# Patient Record
Sex: FEMALE | Race: BLACK OR AFRICAN AMERICAN | NOT HISPANIC OR LATINO | URBAN - METROPOLITAN AREA
[De-identification: names, ages, dates, MRNs, and addresses within clinical notes are randomized per-mention and may not be internally consistent; named-entity substitution may affect disease eponyms.]

---

## 2017-02-01 ENCOUNTER — EMERGENCY (EMERGENCY)
Facility: HOSPITAL | Age: 72
LOS: 0 days | Discharge: HOME | End: 2017-02-01
Attending: EMERGENCY MEDICINE
Payer: MEDICAID

## 2017-02-01 VITALS
RESPIRATION RATE: 20 BRPM | SYSTOLIC BLOOD PRESSURE: 146 MMHG | HEART RATE: 95 BPM | TEMPERATURE: 100 F | OXYGEN SATURATION: 96 % | DIASTOLIC BLOOD PRESSURE: 74 MMHG

## 2017-02-01 VITALS
HEART RATE: 105 BPM | WEIGHT: 177.91 LBS | DIASTOLIC BLOOD PRESSURE: 62 MMHG | HEIGHT: 64 IN | OXYGEN SATURATION: 97 % | RESPIRATION RATE: 22 BRPM | TEMPERATURE: 101 F | SYSTOLIC BLOOD PRESSURE: 151 MMHG

## 2017-02-01 DIAGNOSIS — J10.1 INFLUENZA DUE TO OTHER IDENTIFIED INFLUENZA VIRUS WITH OTHER RESPIRATORY MANIFESTATIONS: ICD-10-CM

## 2017-02-01 DIAGNOSIS — I10 ESSENTIAL (PRIMARY) HYPERTENSION: ICD-10-CM

## 2017-02-01 DIAGNOSIS — M19.90 UNSPECIFIED OSTEOARTHRITIS, UNSPECIFIED SITE: ICD-10-CM

## 2017-02-01 DIAGNOSIS — Z96.659 PRESENCE OF UNSPECIFIED ARTIFICIAL KNEE JOINT: Chronic | ICD-10-CM

## 2017-02-01 DIAGNOSIS — E55.9 VITAMIN D DEFICIENCY, UNSPECIFIED: ICD-10-CM

## 2017-02-01 DIAGNOSIS — L65.9 NONSCARRING HAIR LOSS, UNSPECIFIED: ICD-10-CM

## 2017-02-01 DIAGNOSIS — R05 COUGH: ICD-10-CM

## 2017-02-01 LAB
ANION GAP SERPL CALC-SCNC: 12 MMOL/L — SIGNIFICANT CHANGE UP (ref 5–17)
BASOPHILS # BLD AUTO: 0.1 K/UL — SIGNIFICANT CHANGE UP (ref 0–0.2)
BASOPHILS NFR BLD AUTO: 0.9 % — SIGNIFICANT CHANGE UP (ref 0–2)
BUN SERPL-MCNC: 29 MG/DL — HIGH (ref 7–23)
CALCIUM SERPL-MCNC: 9.4 MG/DL — SIGNIFICANT CHANGE UP (ref 8.5–10.1)
CHLORIDE SERPL-SCNC: 107 MMOL/L — SIGNIFICANT CHANGE UP (ref 96–108)
CK MB BLD-MCNC: 0.4 % — SIGNIFICANT CHANGE UP (ref 0–3.5)
CK MB CFR SERPL CALC: 0.9 NG/ML — SIGNIFICANT CHANGE UP (ref 0.5–3.6)
CK SERPL-CCNC: 251 U/L — HIGH (ref 26–192)
CO2 SERPL-SCNC: 26 MMOL/L — SIGNIFICANT CHANGE UP (ref 22–31)
CREAT SERPL-MCNC: 1.86 MG/DL — HIGH (ref 0.5–1.3)
EOSINOPHIL # BLD AUTO: 0 K/UL — SIGNIFICANT CHANGE UP (ref 0–0.5)
EOSINOPHIL NFR BLD AUTO: 0.2 % — SIGNIFICANT CHANGE UP (ref 0–6)
FLUAV SPEC QL CULT: POSITIVE
FLUBV AG SPEC QL IA: NEGATIVE — SIGNIFICANT CHANGE UP
GLUCOSE SERPL-MCNC: 119 MG/DL — HIGH (ref 70–99)
HCT VFR BLD CALC: 36.5 % — SIGNIFICANT CHANGE UP (ref 34.5–45)
HGB BLD-MCNC: 12.2 G/DL — SIGNIFICANT CHANGE UP (ref 11.5–15.5)
LACTATE SERPL-SCNC: 0.9 MMOL/L — SIGNIFICANT CHANGE UP (ref 0.7–2)
LYMPHOCYTES # BLD AUTO: 0.5 K/UL — LOW (ref 1–3.3)
LYMPHOCYTES # BLD AUTO: 5.8 % — LOW (ref 13–44)
MCHC RBC-ENTMCNC: 27.3 PG — SIGNIFICANT CHANGE UP (ref 27–34)
MCHC RBC-ENTMCNC: 33.5 GM/DL — SIGNIFICANT CHANGE UP (ref 32–36)
MCV RBC AUTO: 81.4 FL — SIGNIFICANT CHANGE UP (ref 80–100)
MONOCYTES # BLD AUTO: 1 K/UL — HIGH (ref 0–0.9)
MONOCYTES NFR BLD AUTO: 10.8 % — SIGNIFICANT CHANGE UP (ref 2–14)
NEUTROPHILS # BLD AUTO: 7.4 K/UL — SIGNIFICANT CHANGE UP (ref 1.8–7.4)
NEUTROPHILS NFR BLD AUTO: 82.3 % — HIGH (ref 43–77)
NT-PROBNP SERPL-SCNC: 688 PG/ML — HIGH (ref 0–125)
PLATELET # BLD AUTO: 256 K/UL — SIGNIFICANT CHANGE UP (ref 150–400)
POTASSIUM SERPL-MCNC: 3.6 MMOL/L — SIGNIFICANT CHANGE UP (ref 3.5–5.3)
POTASSIUM SERPL-SCNC: 3.6 MMOL/L — SIGNIFICANT CHANGE UP (ref 3.5–5.3)
RBC # BLD: 4.48 M/UL — SIGNIFICANT CHANGE UP (ref 3.8–5.2)
RBC # FLD: 13.7 % — SIGNIFICANT CHANGE UP (ref 11–15)
SODIUM SERPL-SCNC: 145 MMOL/L — SIGNIFICANT CHANGE UP (ref 135–145)
TROPONIN I SERPL-MCNC: 0.02 NG/ML — SIGNIFICANT CHANGE UP (ref 0.01–0.04)
WBC # BLD: 9 K/UL — SIGNIFICANT CHANGE UP (ref 3.8–10.5)
WBC # FLD AUTO: 9 K/UL — SIGNIFICANT CHANGE UP (ref 3.8–10.5)

## 2017-02-01 PROCEDURE — 71020: CPT | Mod: 26

## 2017-02-01 PROCEDURE — 99285 EMERGENCY DEPT VISIT HI MDM: CPT

## 2017-02-01 RX ORDER — CHOLECALCIFEROL (VITAMIN D3) 125 MCG
1 CAPSULE ORAL
Qty: 0 | Refills: 0 | COMMUNITY

## 2017-02-01 RX ORDER — FEBUXOSTAT 40 MG/1
1 TABLET ORAL
Qty: 0 | Refills: 0 | COMMUNITY

## 2017-02-01 RX ORDER — SODIUM CHLORIDE 9 MG/ML
500 INJECTION INTRAMUSCULAR; INTRAVENOUS; SUBCUTANEOUS
Qty: 0 | Refills: 0 | Status: DISCONTINUED | OUTPATIENT
Start: 2017-02-01 | End: 2017-02-01

## 2017-02-01 RX ORDER — SODIUM CHLORIDE 9 MG/ML
1000 INJECTION INTRAMUSCULAR; INTRAVENOUS; SUBCUTANEOUS
Qty: 0 | Refills: 0 | Status: COMPLETED | OUTPATIENT
Start: 2017-02-01 | End: 2017-02-01

## 2017-02-01 RX ORDER — SODIUM CHLORIDE 9 MG/ML
3 INJECTION INTRAMUSCULAR; INTRAVENOUS; SUBCUTANEOUS EVERY 8 HOURS
Qty: 0 | Refills: 0 | Status: DISCONTINUED | OUTPATIENT
Start: 2017-02-01 | End: 2017-02-01

## 2017-02-01 RX ORDER — ACETAMINOPHEN 500 MG
975 TABLET ORAL ONCE
Qty: 0 | Refills: 0 | Status: COMPLETED | OUTPATIENT
Start: 2017-02-01 | End: 2017-02-01

## 2017-02-01 RX ADMIN — Medication 75 MILLIGRAM(S): at 18:54

## 2017-02-01 RX ADMIN — SODIUM CHLORIDE 1000 MILLILITER(S): 9 INJECTION INTRAMUSCULAR; INTRAVENOUS; SUBCUTANEOUS at 18:54

## 2017-02-01 RX ADMIN — Medication 975 MILLIGRAM(S): at 17:17

## 2017-02-01 NOTE — ED PROVIDER NOTE - PHYSICAL EXAMINATION
Gen: Alert, NAD, well appearing                  Head: NC, AT, PERRL, EOMI, normal lids/conjunctiva               ENT: normal hearing, patent oropharynx without erythema/exudate, uvula midline, moist mucosa                         Neck: supple, no tenderness/meningismus/JVD, trachea midline                   Pulm: Bilateral BS, normal resp effort, no wheeze/stridor/retractions/rales/rhonchi                      CV: tachycardic, regular, no M/R/G, good dist pulses                Abd: soft, NT/ND, no hepatosplenomegaly                   Mskel: no edema/erythema/cyanosis                Skin: no rash                 Neuro: AAOx3, no sensory/motor deficits                       Back: No tenderness

## 2017-02-01 NOTE — ED PROVIDER NOTE - MEDICAL DECISION MAKING DETAILS
Temp 101.5, given tylenol, NS.  Influenza A positive, otherwise workup unremarkable.  BUN/Cr 29/1.86, baseline.  Pt given tamiflu, nontoxic appearing, vitals stable.  f/u PMD.

## 2017-02-01 NOTE — ED ADULT TRIAGE NOTE - CHIEF COMPLAINT QUOTE
shortness of breath, wheezing and coughing since last night. pt has history of htn. pt denies any chest pain

## 2017-02-01 NOTE — ED ADULT NURSE NOTE - OBJECTIVE STATEMENT
Patient in the ER today complaining of fever and weakness, not being able to get up out of the bed. Patient denies chills, nausea, vomiting, diarrhea. Did not take anything to alleviate the symptoms. Did not get the flu shot this season.

## 2017-02-01 NOTE — ED PROVIDER NOTE - OBJECTIVE STATEMENT
Pertinent PMH/PSH/FHx/SHx and Review of Systems contained within:    70 y/o F with h/o HTN, gout, arthritis c/o dry cough, runny nose, SOB, wheezing since last night.  No sick contacts, no recent overseas travel, no other complaints.  No h/o chronic lung disease.  PMD in [place].  Febrile in triage.    No headache, No photophobia/eye pain/changes in vision, No ear pain/sore throat/dysphagia, No chest pain/palpitations, No stridor, No abdominal pain, No N/V/D, No dysuria/frequency/discharge, No neck/back pain, No rash, No lower extremity edema, No changes in neurological status/function.    No other pertinent PMH.  No other pertinent PSH.  No other pertinent FHx.  Patient denies EtOH/tobacco/illicit substance use.

## 2017-02-02 LAB
FLUAV H1 2009 PAND RNA SPEC QL NAA+PROBE: DETECTED
RAPID RVP RESULT: DETECTED

## 2017-02-07 LAB
CULTURE RESULTS: SIGNIFICANT CHANGE UP
CULTURE RESULTS: SIGNIFICANT CHANGE UP
SPECIMEN SOURCE: SIGNIFICANT CHANGE UP
SPECIMEN SOURCE: SIGNIFICANT CHANGE UP

## 2021-08-16 ENCOUNTER — EMERGENCY (EMERGENCY)
Facility: HOSPITAL | Age: 76
LOS: 1 days | Discharge: HOME | End: 2021-08-16
Attending: EMERGENCY MEDICINE | Admitting: EMERGENCY MEDICINE
Payer: MEDICARE

## 2021-08-16 VITALS
RESPIRATION RATE: 16 BRPM | TEMPERATURE: 97 F | SYSTOLIC BLOOD PRESSURE: 151 MMHG | HEART RATE: 64 BPM | DIASTOLIC BLOOD PRESSURE: 64 MMHG

## 2021-08-16 DIAGNOSIS — Z96.659 PRESENCE OF UNSPECIFIED ARTIFICIAL KNEE JOINT: Chronic | ICD-10-CM

## 2021-08-16 LAB
ALBUMIN SERPL ELPH-MCNC: 4.7 G/DL — SIGNIFICANT CHANGE UP (ref 3.3–5)
ALP SERPL-CCNC: 106 U/L — SIGNIFICANT CHANGE UP (ref 40–120)
ALT FLD-CCNC: 8 U/L — SIGNIFICANT CHANGE UP (ref 4–33)
ANION GAP SERPL CALC-SCNC: 14 MMOL/L — SIGNIFICANT CHANGE UP (ref 7–14)
APTT BLD: 32.3 SEC — SIGNIFICANT CHANGE UP (ref 27–36.3)
AST SERPL-CCNC: 15 U/L — SIGNIFICANT CHANGE UP (ref 4–32)
B PERT DNA SPEC QL NAA+PROBE: SIGNIFICANT CHANGE UP
B PERT+PARAPERT DNA PNL SPEC NAA+PROBE: SIGNIFICANT CHANGE UP
BASE EXCESS BLDV CALC-SCNC: 1.8 MMOL/L — SIGNIFICANT CHANGE UP (ref -2–3)
BASOPHILS # BLD AUTO: 0.03 K/UL — SIGNIFICANT CHANGE UP (ref 0–0.2)
BASOPHILS NFR BLD AUTO: 0.3 % — SIGNIFICANT CHANGE UP (ref 0–2)
BILIRUB SERPL-MCNC: 0.3 MG/DL — SIGNIFICANT CHANGE UP (ref 0.2–1.2)
BLD GP AB SCN SERPL QL: NEGATIVE — SIGNIFICANT CHANGE UP
BLOOD GAS VENOUS COMPREHENSIVE RESULT: SIGNIFICANT CHANGE UP
BORDETELLA PARAPERTUSSIS (RAPRVP): SIGNIFICANT CHANGE UP
BUN SERPL-MCNC: 38 MG/DL — HIGH (ref 7–23)
C PNEUM DNA SPEC QL NAA+PROBE: SIGNIFICANT CHANGE UP
CALCIUM SERPL-MCNC: 10.6 MG/DL — HIGH (ref 8.4–10.5)
CHLORIDE BLDV-SCNC: 111 MMOL/L — HIGH (ref 96–108)
CHLORIDE SERPL-SCNC: 106 MMOL/L — SIGNIFICANT CHANGE UP (ref 98–107)
CO2 BLDV-SCNC: 29.2 MMOL/L — HIGH (ref 22–26)
CO2 SERPL-SCNC: 22 MMOL/L — SIGNIFICANT CHANGE UP (ref 22–31)
CREAT SERPL-MCNC: 2.12 MG/DL — HIGH (ref 0.5–1.3)
EOSINOPHIL # BLD AUTO: 0.31 K/UL — SIGNIFICANT CHANGE UP (ref 0–0.5)
EOSINOPHIL NFR BLD AUTO: 3.1 % — SIGNIFICANT CHANGE UP (ref 0–6)
FLUAV SUBTYP SPEC NAA+PROBE: SIGNIFICANT CHANGE UP
FLUBV RNA SPEC QL NAA+PROBE: SIGNIFICANT CHANGE UP
GAS PNL BLDV: 142 MMOL/L — SIGNIFICANT CHANGE UP (ref 136–145)
GLUCOSE BLDV-MCNC: 102 MG/DL — HIGH (ref 70–99)
GLUCOSE SERPL-MCNC: 103 MG/DL — HIGH (ref 70–99)
HADV DNA SPEC QL NAA+PROBE: SIGNIFICANT CHANGE UP
HCO3 BLDV-SCNC: 28 MMOL/L — SIGNIFICANT CHANGE UP (ref 22–29)
HCOV 229E RNA SPEC QL NAA+PROBE: SIGNIFICANT CHANGE UP
HCOV HKU1 RNA SPEC QL NAA+PROBE: SIGNIFICANT CHANGE UP
HCOV NL63 RNA SPEC QL NAA+PROBE: SIGNIFICANT CHANGE UP
HCOV OC43 RNA SPEC QL NAA+PROBE: SIGNIFICANT CHANGE UP
HCT VFR BLD CALC: 37.3 % — SIGNIFICANT CHANGE UP (ref 34.5–45)
HCT VFR BLDA CALC: 35 % — SIGNIFICANT CHANGE UP (ref 34.5–46.5)
HGB BLD CALC-MCNC: 11.7 G/DL — SIGNIFICANT CHANGE UP (ref 11.5–15.5)
HGB BLD-MCNC: 11.4 G/DL — LOW (ref 11.5–15.5)
HMPV RNA SPEC QL NAA+PROBE: SIGNIFICANT CHANGE UP
HPIV1 RNA SPEC QL NAA+PROBE: SIGNIFICANT CHANGE UP
HPIV2 RNA SPEC QL NAA+PROBE: SIGNIFICANT CHANGE UP
HPIV3 RNA SPEC QL NAA+PROBE: SIGNIFICANT CHANGE UP
HPIV4 RNA SPEC QL NAA+PROBE: SIGNIFICANT CHANGE UP
IANC: 6.1 K/UL — SIGNIFICANT CHANGE UP (ref 1.5–8.5)
IMM GRANULOCYTES NFR BLD AUTO: 0.3 % — SIGNIFICANT CHANGE UP (ref 0–1.5)
INR BLD: 0.97 RATIO — SIGNIFICANT CHANGE UP (ref 0.88–1.16)
LACTATE BLDV-MCNC: 1 MMOL/L — SIGNIFICANT CHANGE UP (ref 0.5–2)
LYMPHOCYTES # BLD AUTO: 2.6 K/UL — SIGNIFICANT CHANGE UP (ref 1–3.3)
LYMPHOCYTES # BLD AUTO: 26.2 % — SIGNIFICANT CHANGE UP (ref 13–44)
M PNEUMO DNA SPEC QL NAA+PROBE: SIGNIFICANT CHANGE UP
MCHC RBC-ENTMCNC: 25.2 PG — LOW (ref 27–34)
MCHC RBC-ENTMCNC: 30.6 GM/DL — LOW (ref 32–36)
MCV RBC AUTO: 82.3 FL — SIGNIFICANT CHANGE UP (ref 80–100)
MONOCYTES # BLD AUTO: 0.85 K/UL — SIGNIFICANT CHANGE UP (ref 0–0.9)
MONOCYTES NFR BLD AUTO: 8.6 % — SIGNIFICANT CHANGE UP (ref 2–14)
NEUTROPHILS # BLD AUTO: 6.1 K/UL — SIGNIFICANT CHANGE UP (ref 1.8–7.4)
NEUTROPHILS NFR BLD AUTO: 61.5 % — SIGNIFICANT CHANGE UP (ref 43–77)
NRBC # BLD: 0 /100 WBCS — SIGNIFICANT CHANGE UP
NRBC # FLD: 0 K/UL — SIGNIFICANT CHANGE UP
PCO2 BLDV: 48 MMHG — HIGH (ref 39–42)
PH BLDV: 7.37 — SIGNIFICANT CHANGE UP (ref 7.32–7.43)
PLATELET # BLD AUTO: 295 K/UL — SIGNIFICANT CHANGE UP (ref 150–400)
PO2 BLDV: 34 MMHG — SIGNIFICANT CHANGE UP
POTASSIUM BLDV-SCNC: 4.2 MMOL/L — SIGNIFICANT CHANGE UP (ref 3.5–5.1)
POTASSIUM SERPL-MCNC: 4.2 MMOL/L — SIGNIFICANT CHANGE UP (ref 3.5–5.3)
POTASSIUM SERPL-SCNC: 4.2 MMOL/L — SIGNIFICANT CHANGE UP (ref 3.5–5.3)
PROT SERPL-MCNC: 8 G/DL — SIGNIFICANT CHANGE UP (ref 6–8.3)
PROTHROM AB SERPL-ACNC: 11.2 SEC — SIGNIFICANT CHANGE UP (ref 10.6–13.6)
RAPID RVP RESULT: DETECTED
RBC # BLD: 4.53 M/UL — SIGNIFICANT CHANGE UP (ref 3.8–5.2)
RBC # FLD: 15 % — HIGH (ref 10.3–14.5)
RH IG SCN BLD-IMP: POSITIVE — SIGNIFICANT CHANGE UP
RSV RNA SPEC QL NAA+PROBE: SIGNIFICANT CHANGE UP
RV+EV RNA SPEC QL NAA+PROBE: DETECTED
SAO2 % BLDV: 60.6 % — SIGNIFICANT CHANGE UP
SARS-COV-2 RNA SPEC QL NAA+PROBE: SIGNIFICANT CHANGE UP
SODIUM SERPL-SCNC: 142 MMOL/L — SIGNIFICANT CHANGE UP (ref 135–145)
WBC # BLD: 9.92 K/UL — SIGNIFICANT CHANGE UP (ref 3.8–10.5)
WBC # FLD AUTO: 9.92 K/UL — SIGNIFICANT CHANGE UP (ref 3.8–10.5)

## 2021-08-16 PROCEDURE — 70551 MRI BRAIN STEM W/O DYE: CPT | Mod: 26

## 2021-08-16 PROCEDURE — 99220: CPT

## 2021-08-16 PROCEDURE — 72141 MRI NECK SPINE W/O DYE: CPT | Mod: 26

## 2021-08-16 PROCEDURE — 73030 X-RAY EXAM OF SHOULDER: CPT | Mod: 26,LT

## 2021-08-16 PROCEDURE — 70450 CT HEAD/BRAIN W/O DYE: CPT | Mod: 26

## 2021-08-16 PROCEDURE — 70549 MR ANGIOGRAPH NECK W/O&W/DYE: CPT | Mod: 26

## 2021-08-16 RX ORDER — AMLODIPINE BESYLATE 2.5 MG/1
10 TABLET ORAL DAILY
Refills: 0 | Status: DISCONTINUED | OUTPATIENT
Start: 2021-08-16 | End: 2021-08-19

## 2021-08-16 RX ORDER — ASPIRIN/CALCIUM CARB/MAGNESIUM 324 MG
81 TABLET ORAL DAILY
Refills: 0 | Status: DISCONTINUED | OUTPATIENT
Start: 2021-08-16 | End: 2021-08-19

## 2021-08-16 NOTE — ED PROVIDER NOTE - ATTENDING CONTRIBUTION TO CARE
75F p/w L arm weakness starting at about 7-8 AM this morning, dropped a cup of coffee.  USOH yesterday.  L arm weakness improved - no focal weakness here.  No fever, cough, SOB, or other sx.  Stroke code called.  Given h/o BELLE pt and family declined IV contrast for CT.  Noncon CTH done.  Plan for MRI/MRA, echo, consider vasc duplex of carotids (can be done OP), place in CDU.  More likely cervical radiculopathy, r/o TIA.    VS:  unremarkable    GEN - NAD;   well appearing;   A+O x3   HEAD - NC/AT     ENT - PEERL, EOMI, mucous membranes    moist , no discharge      NECK: Neck supple, non-tender without lymphadenopathy, no masses, no JVD  PULM - CTA b/l,  symmetric breath sounds  COR -  normal heart sounds    ABD - , ND, NT, soft,  BACK - no CVA tenderness, nontender spine     EXTREMS - no edema, no deformity, warm and well perfused    SKIN - no rash    or bruising      NEUROLOGIC - alert, face symmetric, speech fluent, sensation nl, motor no focal deficit.

## 2021-08-16 NOTE — ED ADULT NURSE NOTE - NSIMPLEMENTINTERV_GEN_ALL_ED
Implemented All Universal Safety Interventions:  [place] to call system. Call bell, personal items and telephone within reach. Instruct patient to call for assistance. Room bathroom lighting operational. Non-slip footwear when patient is off stretcher. Physically safe environment: no spills, clutter or unnecessary equipment. Stretcher in lowest position, wheels locked, appropriate side rails in place.

## 2021-08-16 NOTE — ED PROVIDER NOTE - PHYSICAL EXAMINATION
Gen: WDWN, NAD  HEENT: EOMI, no nasal discharge, mucous membranes moist  CV: 2+ radial pulses b/l  Resp: no accessory muscle use, no increased WOB  GI: Abdomen soft non-distended, NTTP, no masses  MSK: No open wounds, no bruising, no LE edema  Neuro: A&Ox4, following commands, moving all four extremities spontaneously. CN3-12 intact, AOX3, EOMI. PERRLA, strength 5/5 in RUE, RLE, LLE, 4+/5 LUE,  strength 4+/5, decreased sensation LUE, sensation intact LLE, RUE, RLE. Neg for pronator drift, normal finger to nose.  Psych: appropriate mood

## 2021-08-16 NOTE — ED PROVIDER NOTE - NS ED ROS FT
Gen: Denies fevers  Resp: Denies SOB, cough  GI: Denies nausea, vomiting  Msk: + L arm pain  : Denies dysuria  Neuro: + L arm weakness, decreased sensation

## 2021-08-16 NOTE — CONSULT NOTE ADULT - SUBJECTIVE AND OBJECTIVE BOX
HPI: Patient is a 76 yo F w/ pmhx of HTN & gout, on ASA 81 mg at baseline, presents to [facility] ED as code stroke for Left arm weakness at 8 am. LKW at 8 am when the patient dropped a cup, as observed by the daughter. Since then patient endorses weakness and pain on the left arm when she lifts it. Denies dizziness, changes in vision, changes in ambulation, N/V, HA.    (Stroke only)  NIHSS:   MRS:   ICH:     REVIEW OF SYSTEMS  General:	  Skin/Breast:	  Ophthalmologic:  ENMT:	  Respiratory and Thorax:	  Cardiovascular:	  Gastrointestinal:	  Genitourinary:	  Musculoskeletal:	  Neurological:	  Psychiatric:	  Hematology/Lymphatics:	  Endocrine:	  Allergic/Immunologic:	    A 10-system ROS was performed and is negative except for those items noted above and/or in the HPI.    PAST MEDICAL & SURGICAL HISTORY:    FAMILY HISTORY:    SOCIAL HISTORY:   T/E/D:   Occupation:   Lives with:     MEDICATIONS (HOME):  Home Medications:    MEDICATIONS  (STANDING):    MEDICATIONS  (PRN):    ALLERGIES/INTOLERANCES:  Allergies  No Known Allergies    Intolerances    VITALS & EXAMINATION:  Vital Signs Last 24 Hrs  T(C): 36.2 (16 Aug 2021 11:33), Max: 36.2 (16 Aug 2021 11:33)  T(F): 97.2 (16 Aug 2021 11:33), Max: 97.2 (16 Aug 2021 11:33)  HR: 64 (16 Aug 2021 11:33) (64 - 64)  BP: 151/64 (16 Aug 2021 11:33) (151/64 - 151/64)  BP(mean): --  RR: 16 (16 Aug 2021 11:33) (16 - 16)  SpO2: --    General:  Constitutional: Obese Female, appears stated age, in no apparent distress including pain  Head: Normocephalic & atraumatic.  ENT: Patent ear canals, intact TM, mucus membranes moist & pink, neck supple, no lymphadenopathy.   Respiratory: Patent airway. All lung fields are clear to auscultation bilaterally.  Extremities: No cyanosis, clubbing, or edema.  Skin: No rashes, bruising, or discoloration.    Cardiovascular (>2): RRR no murmurs. Carotid pulsations symmetric, no bruits. Normal capillary beds refill, 1-2 seconds or less.     Neurological (>12):  MS: Awake, alert, oriented to person, place, situation, time. Normal affect. Follows all commands.    Language: Speech is clear, fluent with good repetition & comprehension (able to name objects___)    CNs: PERRLA (R = 3mm, L = 3mm). VFF. EOMI no nystagmus, no diplopia. V1-3 intact to LT/pinprick, well developed masseter muscles b/l. No facial asymmetry b/l, full eye closure strength b/l. Hearing grossly normal (rubbing fingers) b/l. Symmetric palate elevation in midline. Gag reflex deferred. Head turning & shoulder shrug intact b/l. Tongue midline, normal movements, no atrophy.    Fundoscopic: pale w/ sharp discs margins No vascular changes.      Motor: Normal muscle bulk & tone. No noticeable tremor or seizure. No pronator drift.              Deltoid	Biceps	Triceps	Wrist	Finger ABd	   R	5	5	5	5	5		5 	  L	5	5	5	5	5		5    	H-Flex	H-Ext	H-ABd	H-ADd	K-Flex	K-Ext	D-Flex	P-Flex  R	5	5	5	5	5	5	5	5 	   L	5	5	5	5	5	5	5	5	     Sensation: Intact to LT/PP/Temp/Vibration/Position b/l throughout.     Cortical: Extinction on DSS (neglect): none    Reflexes:              Biceps(C5)       BR(C6)     Triceps(C7)               Patellar(L4)    Achilles(S1)    Plantar Resp  R	2	          2	             2		        2		    2		Down   L	2	          2	             2		        2		    2		Down     Coordination: intact rapid-alt movements. No dysmetria to FTN/HTS    Gait: Normal Romberg. No postural instability. Normal stance and tandem gait.     LABORATORY:  CBC   Chem       LFTs   Coagulopathy   Lipid Panel   A1c   Cardiac enzymes     U/A   CSF  Immunological  Other    STUDIES & IMAGING:  Studies (EKG, EEG, EMG, etc):     Radiology (XR, CT, MR, U/S, TTE/VEGA): HPI: Patient is a 74 yo F w/ pmhx of HTN & gout, on ASA 81 mg at baseline, presents to [facility] ED as code stroke for Left arm weakness at 8 am. LKW at 8 am when the patient dropped a cup, as observed by the daughter. Since then patient endorses weakness and pain on the left arm when she lifts it. Denies dizziness, changes in vision, changes in ambulation, N/V, HA. Denies trauma to the arm or neck. Patient ambulates with a cane at baseline and requires supervision when walking. Needs assistance with some ADLS.     A 10-system ROS was performed and is negative except for those items noted above and/or in the HPI.    PAST MEDICAL & SURGICAL HISTORY:    FAMILY HISTORY:    SOCIAL HISTORY:   T/E/D:   Occupation:   Lives with:     MEDICATIONS (HOME):  Home Medications:    MEDICATIONS  (STANDING):    MEDICATIONS  (PRN):    ALLERGIES/INTOLERANCES:  Allergies  No Known Allergies    Intolerances    VITALS & EXAMINATION:  Vital Signs Last 24 Hrs  T(C): 36.2 (16 Aug 2021 11:33), Max: 36.2 (16 Aug 2021 11:33)  T(F): 97.2 (16 Aug 2021 11:33), Max: 97.2 (16 Aug 2021 11:33)  HR: 64 (16 Aug 2021 11:33) (64 - 64)  BP: 151/64 (16 Aug 2021 11:33) (151/64 - 151/64)  BP(mean): --  RR: 16 (16 Aug 2021 11:33) (16 - 16)  SpO2: --    General:  Constitutional: Female, appears stated age, in no apparent distress including pain    Neurological (>12):  MS: Awake, alert, oriented to person, place, situation, time. Normal affect. Follows all commands.    Language: Speech is clear, fluent with good repetition & comprehension.    CNs: PERRL (R = 3mm, L = 3mm). VFF. EOMI no nystagmus, no diplopia. V1-3 intact to LT/pinprick b/l. No facial asymmetry b/l, full eye closure strength b/l. Hearing grossly normal (rubbing fingers) b/l. Symmetric palate elevation in midline. Gag reflex deferred. Head turning & shoulder shrug intact b/l. Tongue midline, normal movements, no atrophy.    Motor: Normal muscle bulk & tone. No noticeable tremor or seizure. No motor drift on UE or LE b/l.              Deltoid	Biceps	Triceps	Wrist	   R	5	5	5	5	5 	  L	5	4	4	5	4    	H-Flex	K-Flex	K-Ext	D-Flex	P-Flex  R	5	5	5	5	5		   L	5	5	5	5	5		     Sensation: Hypoesthesia on Left arm. Otherwise, intact to LT b/l throughout.     Cortical: Extinction on DSS (neglect): none    Reflexes:              Biceps(C5)       BR(C6)     Triceps(C7)               Patellar(L4)    Achilles(S1)    Plantar Resp  R	  L	    Coordination: intact rapid-alt movements. No dysmetria to FTN/HTS    Gait:       STUDIES & IMAGING:  Studies (EKG, EEG, EMG, etc):     Radiology (XR, CT, MR, U/S, TTE/VEGA):    NCCT: No acute hemorrhage, midline shift, mass effect, or acute infarcts. 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 LKW at 8 am when the patient dropped a cup, as observed by the daughter. Since then patient endorses weakness and pain on the left arm when she lifts it. Patient claims that she dropped the cup 2/2 dull pain she was experiencing from her left shoulder to her wrist. Denies dizziness, changes in vision, changes in ambulation, N/V, HA. Denies trauma to the arm or neck. Patient ambulates with a cane at baseline and requires supervision when walking. Needs assistance with some ADLS.     A 10-system ROS was performed and is negative except for those items noted above and/or in the HPI.    PAST MEDICAL & SURGICAL HISTORY:    FAMILY HISTORY:    SOCIAL HISTORY:   T/E/D:   Occupation:   Lives with:     MEDICATIONS (HOME):  Home Medications:    MEDICATIONS  (STANDING):    MEDICATIONS  (PRN):    ALLERGIES/INTOLERANCES:  Allergies  No Known Allergies    Intolerances    VITALS & EXAMINATION:  Vital Signs Last 24 Hrs  T(C): 36.2 (16 Aug 2021 11:33), Max: 36.2 (16 Aug 2021 11:33)  T(F): 97.2 (16 Aug 2021 11:33), Max: 97.2 (16 Aug 2021 11:33)  HR: 64 (16 Aug 2021 11:33) (64 - 64)  BP: 151/64 (16 Aug 2021 11:33) (151/64 - 151/64)  BP(mean): --  RR: 16 (16 Aug 2021 11:33) (16 - 16)  SpO2: --    General:  Constitutional: Female, appears stated age, in no apparent distress including pain    Neurological (>12):  MS: Awake, alert, oriented to person, place, situation, time. Normal affect. Follows all commands.    Language: Speech is clear, fluent with good repetition & comprehension.    CNs: PERRL (R = 3mm, L = 3mm). VFF. EOMI no nystagmus, no diplopia. V1-3 intact to LT b/l. No facial asymmetry b/l, full eye closure strength b/l. Hearing grossly normal (rubbing fingers) b/l. Symmetric palate elevation in midline. Gag reflex deferred. Head turning & shoulder shrug intact b/l. Tongue midline, normal movements, no atrophy.    Motor: Normal muscle bulk & tone. No noticeable tremor or seizure. No motor drift on UE or LE b/l.              Deltoid	Biceps	Triceps	Wrist	   R	5	5	5	5	5 	  L	4	4+	4+	5	4+    Pain elicited w/ L. deltoid flexion.     	H-Flex	K-Flex	K-Ext	D-Flex	P-Flex  R	5	5	5	5	5		   L	5	5	5	5	5		     Sensation: Hypoesthesia on Left arm. Otherwise, intact to LT b/l throughout.     Cortical: Extinction on DSS (neglect): none    Reflexes:              Biceps(C5)       BR(C6)     Triceps(C7)               Patellar(L4)    Achilles(S1)    Plantar Resp  R	2                        2             1                                Mute          1                   Down  L	2                        2             1                                Mute          1                   Down    Coordination: intact rapid-alt movements. No dysmetria to FTN/HTS    Gait: Steady stance & gait, ambulates with cane in R. hand at baseline.       STUDIES & IMAGING:  Studies (EKG, EEG, EMG, etc):     Radiology (XR, CT, MR, U/S, TTE/VEGA):    NCCT: No acute hemorrhage, midline shift, mass effect, or acute infarcts. There is diffuse cerebral volume loss with prominence of the sulci, fissures, and cisternal spaces which is normal for the patient's age. There is minimal deep and periventricular white matter hypoattenuation statistically compatible with microvascular changes given calcific atherosclerotic disease of the intracranial arteries.    Patient refused IV contrast to obtain CTA 2/2 elevated Cr.        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 LKW at 8 am when the patient dropped a cup, as observed by the daughter. Since then patient endorses weakness and pain on the left arm when she lifts it. Patient claims that she dropped the cup 2/2 dull pain she was experiencing from her left shoulder to her wrist. Denies dizziness, changes in vision, changes in ambulation, N/V, HA. Denies trauma to the arm or neck. Patient ambulates with a cane at baseline and requires supervision when walking. Needs assistance with some ADLS.     A 10-system ROS was performed and is negative except for those items noted above and/or in the HPI.    PAST MEDICAL & SURGICAL HISTORY:    FAMILY HISTORY:    SOCIAL HISTORY:   T/E/D:   Occupation:   Lives with:     MEDICATIONS (HOME):  Home Medications:    MEDICATIONS  (STANDING):    MEDICATIONS  (PRN):    ALLERGIES/INTOLERANCES:  Allergies  No Known Allergies    Intolerances    VITALS & EXAMINATION:  Vital Signs Last 24 Hrs  T(C): 36.2 (16 Aug 2021 11:33), Max: 36.2 (16 Aug 2021 11:33)  T(F): 97.2 (16 Aug 2021 11:33), Max: 97.2 (16 Aug 2021 11:33)  HR: 64 (16 Aug 2021 11:33) (64 - 64)  BP: 151/64 (16 Aug 2021 11:33) (151/64 - 151/64)  BP(mean): --  RR: 16 (16 Aug 2021 11:33) (16 - 16)  SpO2: --    General:  Constitutional: Female, appears stated age, in no apparent distress including pain    Neurological (>12):  MS: Awake, alert, oriented to person, place, situation, time. Normal affect. Follows all commands.    Language: Speech is clear, fluent with good repetition & comprehension.    CNs: PERRL (R = 3mm, L = 3mm). VFF. EOMI no nystagmus, no diplopia. V1-3 intact to LT b/l. No facial asymmetry b/l, full eye closure strength b/l. Hearing grossly normal (rubbing fingers) b/l. Symmetric palate elevation in midline. Gag reflex deferred. Head turning & shoulder shrug intact b/l. Tongue midline, normal movements, no atrophy.    Motor: Normal muscle bulk & tone. No noticeable tremor or seizure. No motor drift on UE or LE b/l.              Deltoid	Biceps	Triceps	Wrist	   R	5	5	5	5	5 	  L	4	4+	4+	5	4+    Pain elicited w/ L. deltoid flexion. Pain elicited on palpation of L. posterior aspect of acromion process.     	H-Flex	K-Flex	K-Ext	D-Flex	P-Flex  R	5	5	5	5	5		   L	5	5	5	5	5		     Sensation: Hypoesthesia on Left arm. Otherwise, intact to LT b/l throughout.     Cortical: Extinction on DSS (neglect): none    Reflexes:              Biceps(C5)       BR(C6)     Triceps(C7)               Patellar(L4)    Achilles(S1)    Plantar Resp  R	2                        2             1                                Mute          1                   Down  L	2                        2             1                                Mute          1                   Down    Coordination: intact rapid-alt movements. No dysmetria to FTN/HTS    Gait: Steady stance & gait, ambulates with cane in R. hand at baseline.       STUDIES & IMAGING:  Studies (EKG, EEG, EMG, etc):     Radiology (XR, CT, MR, U/S, TTE/VEGA):    NCCT: No acute hemorrhage, midline shift, mass effect, or acute infarcts. There is diffuse cerebral volume loss with prominence of the sulci, fissures, and cisternal spaces which is normal for the patient's age. There is minimal deep and periventricular white matter hypoattenuation statistically compatible with microvascular changes given calcific atherosclerotic disease of the intracranial arteries.     Patient refused IV contrast to obtain CTA 2/2 elevated Cr.        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 LKW at 8 am when the patient dropped a cup, as observed by the daughter. Since then patient endorses weakness and pain on the left arm when she lifts it. Patient claims that she dropped the cup 2/2 dull pain she was experiencing from her left shoulder to her wrist. Denies dizziness, changes in vision, changes in ambulation, N/V, HA. Denies trauma to the arm or neck. Patient ambulates with a cane at baseline and requires supervision when walking. Needs assistance with some ADLS. Denies hx of stroke.     A 10-system ROS was performed and is negative except for those items noted above and/or in the HPI.    MEDICATIONS (HOME):  Home Medications:    MEDICATIONS  (STANDING):    MEDICATIONS  (PRN):    ALLERGIES/INTOLERANCES:  Allergies  No Known Allergies    Intolerances    VITALS & EXAMINATION:  Vital Signs Last 24 Hrs  T(C): 36.2 (16 Aug 2021 11:33), Max: 36.2 (16 Aug 2021 11:33)  T(F): 97.2 (16 Aug 2021 11:33), Max: 97.2 (16 Aug 2021 11:33)  HR: 64 (16 Aug 2021 11:33) (64 - 64)  BP: 151/64 (16 Aug 2021 11:33) (151/64 - 151/64)  BP(mean): --  RR: 16 (16 Aug 2021 11:33) (16 - 16)  SpO2: --    General:  Constitutional: Female, appears stated age, in no apparent distress including pain    Neurological (>12):  MS: Awake, alert, oriented to person, place, situation, time. Normal affect. Follows all commands.    Language: Speech is clear, fluent with good repetition & comprehension.    CNs: PERRL (R = 3mm, L = 3mm). VFF. EOMI no nystagmus, no diplopia. V1-3 intact to LT b/l. No facial asymmetry b/l, full eye closure strength b/l. Hearing grossly normal (rubbing fingers) b/l. Symmetric palate elevation in midline. Gag reflex deferred. Head turning & shoulder shrug intact b/l. Tongue midline, normal movements, no atrophy.    Motor: Normal muscle bulk & tone. No noticeable tremor or seizure. No motor drift on UE or LE b/l.              Deltoid	Biceps	Triceps	Wrist	   R	5	5	5	5	5 	  L	4	4+	4+	5	4+    Pain elicited w/ L. deltoid flexion. Pain elicited on palpation of L. posterior aspect of acromion process.     	H-Flex	K-Flex	K-Ext	D-Flex	P-Flex  R	5	5	5	5	5		   L	5	5	5	5	5		     Sensation: Hypoesthesia on Left arm. Otherwise, intact to LT b/l throughout.     Cortical: Extinction on DSS (neglect): none    Reflexes:              Biceps(C5)       BR(C6)     Triceps(C7)               Patellar(L4)    Achilles(S1)    Plantar Resp  R	2                        2             1                                Mute          1                   Down  L	2                        2             1                                Mute          1                   Down    Coordination: intact rapid-alt movements. No dysmetria to FTN/HTS    Gait: Steady stance & gait, ambulates with cane in R. hand at baseline.       STUDIES & IMAGING:  Studies (EKG, EEG, EMG, etc):     Radiology (XR, CT, MR, U/S, TTE/VEGA):    NCCT: No acute hemorrhage, midline shift, mass effect, or acute infarcts. There is diffuse cerebral volume loss with prominence of the sulci, fissures, and cisternal spaces which is normal for the patient's age. There is minimal deep and periventricular white matter hypoattenuation statistically compatible with microvascular changes given calcific atherosclerotic disease of the intracranial arteries.     Patient refused IV contrast to obtain CTA 2/2 elevated Cr.        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 LKW at 8 am when the patient dropped a cup, as observed by the daughter. Since then patient endorses weakness and pain on the left arm when she lifts it. Patient claims that she dropped the cup 2/2 dull pain she was experiencing from her left shoulder to her wrist. Denies dizziness, changes in vision, changes in ambulation, N/V, HA. Denies trauma to the arm or neck. Patient ambulates with a cane at baseline and requires supervision when walking. Needs assistance with some ADLS. Denies hx of stroke.     A 10-system ROS was performed and is negative except for those items noted above and/or in the HPI.    MEDICATIONS (HOME):  Home Medications:    MEDICATIONS  (STANDING):    MEDICATIONS  (PRN):    ALLERGIES/INTOLERANCES:  Allergies  No Known Allergies    Intolerances    VITALS & EXAMINATION:  Vital Signs Last 24 Hrs  T(C): 36.2 (16 Aug 2021 11:33), Max: 36.2 (16 Aug 2021 11:33)  T(F): 97.2 (16 Aug 2021 11:33), Max: 97.2 (16 Aug 2021 11:33)  HR: 64 (16 Aug 2021 11:33) (64 - 64)  BP: 151/64 (16 Aug 2021 11:33) (151/64 - 151/64)  BP(mean): --  RR: 16 (16 Aug 2021 11:33) (16 - 16)  SpO2: --    General:  Constitutional: Female, appears stated age, in no apparent distress including pain    Neurological (>12):  MS: Awake, alert, oriented to person, place, situation, time. Normal affect. Follows all commands.    Language: Speech is clear, fluent with good repetition & comprehension.    CNs: PERRL (R = 3mm, L = 3mm). VFF. EOMI no nystagmus, no diplopia. V1-3 intact to LT b/l. No facial asymmetry b/l, full eye closure strength b/l. Hearing grossly normal (rubbing fingers) b/l. Symmetric palate elevation in midline. Gag reflex deferred. Head turning & shoulder shrug intact b/l. Tongue midline, normal movements, no atrophy.    Motor: Normal muscle bulk & tone. No noticeable tremor or seizure. No motor drift on UE or LE b/l.              Deltoid	Biceps	Triceps	Wrist	   R	5	5	5	5	5 	  L	4	4+	4+	5	4+    Pain elicited w/ L. deltoid flexion. Pain elicited on palpation of L. posterior aspect of acromion process.     	H-Flex	K-Flex	K-Ext	D-Flex	P-Flex  R	5	5	5	5	5		   L	5	5	5	5	5		     Sensation: Hypoesthesia on Left arm. Otherwise, intact to LT b/l throughout.     Cortical: Extinction on DSS (neglect): none    Reflexes:              Biceps(C5)       BR(C6)     Triceps(C7)               Patellar(L4)    Achilles(S1)    Plantar Resp  R	2                        2             1                                Mute          1                   Down  L	2                        2             1                                Mute          1                   Down    Patient has b/l knee replacement.    Coordination: intact rapid-alt movements. No dysmetria to FTN/HTS    Gait: Steady stance & gait, ambulates with cane in R. hand at baseline.       STUDIES & IMAGING:  Studies (EKG, EEG, EMG, etc):     Radiology (XR, CT, MR, U/S, TTE/VEGA):    NCCT: No acute hemorrhage, midline shift, mass effect, or acute infarcts. There is diffuse cerebral volume loss with prominence of the sulci, fissures, and cisternal spaces which is normal for the patient's age. There is minimal deep and periventricular white matter hypoattenuation statistically compatible with microvascular changes given calcific atherosclerotic disease of the intracranial arteries.     Patient refused IV contrast to obtain CTA 2/2 elevated Cr.        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 Patient presents with her daughter who witnessed the event. LKW at 8 am when the patient dropped a cup, as observed by the daughter. Since then patient endorses weakness and pain on the left arm when she lifts it. Patient claims that she dropped the cup 2/2 dull pain she was experiencing from her left shoulder to her wrist. Denies dizziness, changes in vision, changes in ambulation, N/V, HA. Denies trauma to the arm or neck. Patient ambulates with a cane at baseline and requires supervision when walking. Needs assistance with some ADLS. Denies hx of stroke.     A 10-system ROS was performed and is negative except for those items noted above and/or in the HPI.    MEDICATIONS (HOME):  Home Medications:    MEDICATIONS  (STANDING):    MEDICATIONS  (PRN):    ALLERGIES/INTOLERANCES:  Allergies  No Known Allergies    Intolerances    VITALS & EXAMINATION:  Vital Signs Last 24 Hrs  T(C): 36.2 (16 Aug 2021 11:33), Max: 36.2 (16 Aug 2021 11:33)  T(F): 97.2 (16 Aug 2021 11:33), Max: 97.2 (16 Aug 2021 11:33)  HR: 64 (16 Aug 2021 11:33) (64 - 64)  BP: 151/64 (16 Aug 2021 11:33) (151/64 - 151/64)  BP(mean): --  RR: 16 (16 Aug 2021 11:33) (16 - 16)  SpO2: --    General:  Constitutional: Female, appears stated age, in no apparent distress including pain    Neurological (>12):  MS: Awake, alert, oriented to person, place, situation, time. Normal affect. Follows all commands.    Language: Speech is clear, fluent with good repetition & comprehension.    CNs: PERRL (R = 3mm, L = 3mm). VFF. EOMI no nystagmus, no diplopia. V1-3 intact to LT b/l. No facial asymmetry b/l, full eye closure strength b/l. Hearing grossly normal (rubbing fingers) b/l. Symmetric palate elevation in midline. Gag reflex deferred. Head turning & shoulder shrug intact b/l. Tongue midline, normal movements, no atrophy.    Motor: Normal muscle bulk & tone. No noticeable tremor or seizure. No motor drift on UE or LE b/l.              Deltoid	Biceps	Triceps	Wrist	   R	5	5	5	5	5 	  L	4	4+	4+	5	4+    Pain elicited w/ L. deltoid flexion. Pain elicited on palpation of L. posterior aspect of acromion process.     	H-Flex	K-Flex	K-Ext	D-Flex	P-Flex  R	5	5	5	5	5		   L	5	5	5	5	5		     Sensation: Hypoesthesia on Left arm. Otherwise, intact to LT b/l throughout.     Cortical: Extinction on DSS (neglect): none    Reflexes:              Biceps(C5)       BR(C6)     Triceps(C7)               Patellar(L4)    Achilles(S1)    Plantar Resp  R	2                        2             1                                Mute          1                   Down  L	2                        2             1                                Mute          1                   Down    Patient has b/l knee replacement.    Coordination: intact rapid-alt movements. No dysmetria to FTN/HTS    Gait: Steady stance & gait, ambulates with cane in R. hand at baseline.       STUDIES & IMAGING:  Studies (EKG, EEG, EMG, etc):     Radiology (XR, CT, MR, U/S, TTE/VEGA):    NCCT: No acute hemorrhage, midline shift, mass effect, or acute infarcts. There is diffuse cerebral volume loss with prominence of the sulci, fissures, and cisternal spaces which is normal for the patient's age. There is minimal deep and periventricular white matter hypoattenuation statistically compatible with microvascular changes given calcific atherosclerotic disease of the intracranial arteries.     Patient refused IV contrast to obtain CTA 2/2 elevated Cr.

## 2021-08-16 NOTE — ED ADULT NURSE NOTE - OBJECTIVE STATEMENT
[facility] RN received pt to CT scan area reports witnessed sudden onset of LUE weakness this morning at 8AM, as per pt she dropped her coffee cup. On assessment noted to be c/o pain to posterior left arm. No facial droop or speech slurring per daughter at bedside. RAC PIV 18G placed, labs sent, VS as documented, will endorse to primary RN.

## 2021-08-16 NOTE — ED PROVIDER NOTE - PROGRESS NOTE DETAILS
Bridgett Chan MD, PGY-2: pt with improved weakness in L UE, back to baseline with strength and sensation. attg on phone with CDU, discussing plan for MR head/neck

## 2021-08-16 NOTE — ED ADULT TRIAGE NOTE - CHIEF COMPLAINT QUOTE
Pt states that she developed left sided weakness at 0800 witnessed by , pt was drinking coffee and dropped the cup.  PMH HTN gout

## 2021-08-16 NOTE — CONSULT NOTE ADULT - ASSESSMENT
INCOMPLETE    Assessment: Patient is a 74 yo F w/ pmhx of HTN & gout, on ASA 81 mg at baseline p/w left arm monosensory loss and monoparesis a/w dull pain when flexing L. deltoid. LKW at 8 am.       (Stroke only)  NIHSS: 1  MRS: 4    Patient not a tPA candidate as symptoms unlikely to be due to acute ischemic episode  Patient not a endovascular candidate as symptoms unlikely to contribute to LVO.    Impression: Left arm monosensory loss and monoparesis a/w dull pain when flexing L. deltoid likely 2/2 musculoskeletal or peripheral radiculopathy. Unlikely to be an acute ischemic stroke.     Recommendations:    Imaging:  []   []    Meds:  [] C/w ASA 81 mg     Labs:  [] CBC, CMP, Coags, & Troponin   [] Lipid Panel  [] HgA1C    Other:     INCOMPLETE    Assessment: Patient is a 76 yo F w/ pmhx of HTN & gout, on ASA 81 mg at baseline p/w left arm monosensory loss and monoparesis a/w dull pain when flexing L. deltoid. LKW at 8 am. BP is 151/64 mmHg, otherwise vitals wnl. On exam, mild monoparesis of L. arm in Deltoid, Biceps, Triceps, and . Mild, dull pain elicited on flexion of L. deltoid. Mild hypoesthesia of L. arm from shoulder to wrist. Her reflexes in Triceps and Achilles are 1 b/l.     (Stroke only)  NIHSS: 1  MRS: 4    Patient not a tPA candidate as symptoms unlikely to be due to acute ischemic episode  Patient not a endovascular candidate as symptoms unlikely to contribute to LVO.    Impression: Left arm monosensory loss and monoparesis a/w mild, dull pain when flexing L. deltoid likely 2/2 musculoskeletal or peripheral radiculopathy. Unlikely to be an acute ischemic stroke.     Recommendations:    Imaging:  []   []    Meds:  [] C/w ASA 81 mg     Labs:  [] CBC, CMP, Coags, & Troponin   [] Lipid Panel  [] HgA1C    Other:     Assessment: Patient is a 74 yo F w/ pmhx of HTN & gout, and Spinal Fusion in 2020, on ASA 81 mg at baseline p/w acute left arm monosensory loss and left arm monoparesis a/w dull pain when flexing L. deltoid. LKW at 8 am. BP is 151/64 mmHg, otherwise vitals wnl. On exam, mild monoparesis of L. arm in Deltoid, Biceps, Triceps, and . Mild, dull pain elicited on flexion of L. deltoid and with palpation of posterior aspect of L. acromion process. Mild hypoesthesia of L. arm from shoulder to wrist. Her reflexes in Triceps and Achilles are 1 b/l. NCCT is unremarkable for acute findings, but demonstrates chronic microvascular changes.    (Stroke only)  NIHSS: 1  MRS: 4    Patient not a tPA candidate as symptoms unlikely to be due to an acute ischemic episode and are non-disabling symptoms.  Patient not a endovascular candidate as symptoms unlikely due to LVO.    Impression: Left arm monosensory loss and monoparesis a/w mild, dull pain when flexing L. deltoid likely 2/2 musculoskeletal dysfunction or peripheral radiculopathy. Unlikely to be an acute ischemic stroke.     Recommendations:    Imaging:  [] MRI cervical spine w/o contrast  [] MRI brain w/o contrast  [] AP/Lateral radiography of L. shoulder     Meds:  [] C/w ASA 81 mg     Labs:  [] CBC, CMP, Coags, & Troponin   [] Lipid Panel  [] HgA1C    Other:  [] Upon discharge, PT should F/U with Dr. Loving:  [phone] [address] [place] [address]    Patient case discussed with stroke fellow, Dr. Doe, under the supervision of stroke attending. To be discussed with stroke attending.       Assessment: Patient is a 76 yo F w/ pmhx of HTN & gout, and Spinal Fusion in 2020, on ASA 81 mg at baseline p/w acute left arm monosensory loss and left arm monoparesis a/w dull pain when flexing L. deltoid. LKW at 8 am. BP is 151/64 mmHg, otherwise vitals wnl. On exam, mild monoparesis of L. arm in Deltoid, Biceps, Triceps, and . Mild, dull pain elicited on flexion of L. deltoid and with palpation of posterior aspect of L. acromion process. Mild hypoesthesia of L. arm from shoulder to wrist. Her reflexes in Triceps and Achilles are 1 b/l. NCCT is unremarkable for acute findings, but demonstrates chronic microvascular changes.    (Stroke only)  NIHSS: 1  MRS: 4    Patient not a tPA candidate as symptoms unlikely to be due to an acute ischemic episode and are non-disabling symptoms.  Patient not a endovascular candidate as symptoms unlikely due to LVO.    Impression: Left arm monosensory loss and monoparesis a/w mild, dull pain when flexing L. deltoid likely 2/2 musculoskeletal dysfunction or peripheral radiculopathy. Unlikely to be an acute ischemic stroke.     Recommendations:    Imaging:  [] MRI cervical spine w/o contrast   [] MRI brain w/o contrast to r/o central pathology   [] MRA Head & Neck w/o contrast for vessel imaging  [] AP/Lateral radiography of L. shoulder     Meds:  [] C/w ASA 81 mg     Labs:  [] CBC, CMP, Coags, & Troponin   [] Lipid Panel  [] HgA1C    Other:  [] Upon discharge, PT should F/U with Dr. Loving:  [phone] [address] [place] [address]    Patient case discussed with stroke fellow, Dr. Doe, under the supervision of stroke attending. To be discussed with stroke attending.       Assessment: Patient is a 76 yo F w/ pmhx of HTN & gout, and Spinal Fusion in 2020, b/l knee replacement, on ASA 81 mg at baseline p/w acute left arm monosensory loss and left arm monoparesis a/w dull pain when flexing L. deltoid. LKW at 8 am. BP is 151/64 mmHg, otherwise vitals wnl. On exam, mild monoparesis of L. arm in Deltoid, Biceps, Triceps, and . Mild, dull pain elicited on flexion of L. deltoid and with palpation of posterior aspect of L. acromion process. Mild hypoesthesia of L. arm from shoulder to wrist. Her reflexes in Triceps and Achilles are 1 b/l. NCCT is unremarkable for acute findings, but demonstrates chronic microvascular changes.    (Stroke only)  NIHSS: 1  MRS: 4    Patient not a tPA candidate as symptoms unlikely to be due to an acute ischemic episode and are non-disabling symptoms.  Patient not a endovascular candidate as symptoms unlikely due to LVO.    Impression: Left arm monosensory loss and monoparesis a/w mild, dull pain when flexing L. deltoid likely 2/2 musculoskeletal dysfunction or peripheral radiculopathy. Unlikely to be an acute ischemic stroke.     Recommendations:    Imaging:  [] MRI cervical spine w/o contrast   [] MRI brain w/o contrast to r/o central pathology   [] MRA Head & Neck w/o contrast for vessel imaging  [] AP/Lateral radiography of L. shoulder     Meds:  [] C/w ASA 81 mg     Labs:  [] CBC, CMP, Coags, & Troponin   [] Lipid Panel  [] HgA1C    Other:  [] Upon discharge, PT should F/U with Dr. Loving:  [phone] [address] [place] [address]    Patient case discussed with stroke fellow, Dr. Doe, under the supervision of stroke attending. To be discussed with stroke attending.

## 2021-08-16 NOTE — CONSULT NOTE ADULT - ATTENDING COMMENTS
code stroke called in ED and neurology emergently assessed patient.   Briefly 76 yo F w/ HTN & gout, and Spinal Fusion in 2020, b/l knee replacement, on ASA 81 mg at baseline p/w acute left arm monosensory loss and left arm monoparesis a/w dull pain when flexing L. deltoid. LKW at 8 am. BP is 151/64 mmHg, otherwise vitals wnl. exam now improved back to baseline.     NIHSS: 1  MRS: 4    Patient not a tPA candidate as symptoms unlikely to be due to an acute ischemic episode and are non-disabling symptoms.  Patient not a endovascular candidate as symptoms unlikely due to LVO.    Impression: Left arm monosensory loss and monoparesis a/w mild, dull pain when flexing L. deltoid likely 2/2 musculoskeletal dysfunction or peripheral radiculopathy.  now improved      Recommendations:  MRI brain neg for infarct. MRI C spine with C3/4 disc with cord flattening, MRA H/N mild to mod disease. LDL 5.9, LDL 53.   - c/w home asa, consider lipitor 20  - should f/u with spine/nsx for c3/4 disc  - PT/OT  - spoke with CDU team  outpt f/u  Willian Loving MD  Vascular Neurology  Office: [phone]

## 2021-08-16 NOTE — ED ADULT NURSE NOTE - CAS TRG GENERAL NORM CIRC DET
Discharge Information


Report Includes


Report will include the:  Discharge Instructions & Summary





Admission


Admission Date / Time:  2018 at 12:15


Reason for Admission:  Unspecified Depressive Disorder





Discharge


Discharge Diagnosis / Problem:  Depression


Condition at Discharge:  Good





Discharge Goals


Goal(s):  Improve function, Improve disease control, Learn about illness, 

Therapeutic intervention





Activity Recommendations


Activity Limitations:  per Instructions/Follow-up section





.





Instructions / Follow-Up


Instructions / Follow-Up


.





SPECIAL CARE INSTRUCTIONS:





1.  Follow through with your scheduled aftercare appointments.  If unable to 

keep an appointment, please call to reschedule.





2.  Take your medication only as prescribed.  Medication should not be changed 

or stopped without the approval of your doctor.  In the event of worsening 

symptoms or concerns about side effects, contact your doctor immediately.





3.  Utilize new healthy coping skills, anger management skills, and stress 

management skills learned during your hospitalization.  Journal feelings and 

process them with a support person.  Identify stressors or situations that may 

result in relapse, deterioration or inappropriate behaviors and develop a plan 

to deal with those issues.





4.  If your coping skills are ineffective and you are in crisis, contact your 

outpatient providers for direction.  If unable to reach your providers, please 

call the  [facility] HELP LINE AT [phone] or go to the closest Emergency Room.





5.  Avoid alcohol and un-prescribed drugs.





6.  You have been provided with the Mental Health Advance Directives Pamphlet 

for your review.








AFTERCARE APPOINTMENTS: 





*  Please call your insurance company prior to your scheduled appointment to 

confirm your aftercare providers are covered.  Take your insurance information 

to your appointments.





.





Discharge / Aftercare Planning


Primary Care Physician:  


   Name:  dewayne.


Therapist:  


   Name Of Therapist:  amanda


:  


   Name:  amanda





.





Follow-Up Care


Plan for Follow-Up Care:


See above - [facility].





Current Hospital Diet


Patient's current hospital diet: Regular Diet





Discharge Diet


Recommended Diet:  Regular Diet





Procedures


Procedures Performed:  No





Pending Studies


Pending Studies at Discharge:  No





Medical Emergencies








.


Who to Call and When:





Medical Emergencies:  


For questions or emergencies related to your hospital stay, please contact the 

Inpatient Behavioral Health Unit at [phone].





A psychiatric clinician is on-call  for the Behavioral Health Unit for 

emergencies





At any time you feel your situation is an emergency, you may also call 911 

immediately.





.





Non-Emergent Contact


Non-Emergency issues call your:  Primary Care Provider, Psychiatrist, Therapist





Past History


Medical & Surgical History:  


(1) Nicotine dependence


(2) GERD (gastroesophageal reflux disease)


(3) Hyperlipemia





Advance Directives


Existing Advance Directive:  No


Do You Have an Existing Mental:  No


Existing Living Will:  No


Existing Power of :  No


Advance Directives Info Given:  To Pt/S.O.


Advance Directives Reason:


Declines as Mental Health Visit.





Discharge Summary


Admission HPI


Per the Admitting provider:


Ty Carolina is a 45-year-old  male admitted to [address] [place] [address] following 

acceptance from [facility] ED.  Pt reports having been off medications 

for the past month after losing his job and subsequently losing his insurance.  

He also reports increased stress as his mother-in-law has been living in his 

home and requires a considerable amount of care.  Pt states he and his wife 

were in an argument yesterday in which she had threatened to leave and he had 

said, "well if I don't have a job or a wife, I might as well just shoot myself"

.  Pt's mother and brother were informed if the comment and he was brought to 

the ED for mental health evaluation.  Pt was agreeable to inpatient treatment.  





Pt states he has struggled with depression for several years, with worsening in 

the past 2-3.  He reports low mood, decreased energy, hopelessness for the past 

few days, and feeling as though he is a burden.  Pt reports occasional passive 

SI over the past few years, but states he had never formed a plan or had intent 

or acts of furtherance.  He denies contemplation of suicide prior to the 

argument with his wife and states the comment lacked intent.  Pt reports 

anxiety as well, reporting irritability and feeling "on-edge".  He denies 

history of panic attacks.  





Pt current receives prescriptions from [facility].  He was prescribed 

risperidone 3mg, venlafaxine 150mg, and clonazepam 0.5mg BID-TID.  Pt denies SI/

HI, SIB, A/V hallucinations, paranoia, manuel, OCD, PTSD, eating disorder, and 

other psychosis.





Admission Exam


Per the Admitting provider:


Please see admission H&P.





Consultations


None.





Hospital Course





(1) Depressive disorder


2/2 


   - Start risperidone 1mg qHS, prn dosing of 1mg BID for anxiety/agitation.  

Started on fluoxetine 20mg qAM for antidepressant coverage.


   - Fasting glucose and lipid panel ordered for tomorrow AM.


   - Request records from patient's outpatient providers


   - Encourage participation in group programming including recreational 

therapy and group therapy


   - Encourage phone/in-person meeting with identified supports. 





2/3 


   - D/C Risperdal in favor of Haldol 1mg qHS.  Continue Prozac 20mg as above. 

Encourage use of prn Vistaril for anxiety.


   - Fasting labs reviewed.  Elevated glucose-106; triglycerides-285, and total 

cholesterol-239.  Other parameters within normal range.  Discussed need for 

ongoing monitoring with antipsychotic treatment as patient is already receiving 

medications for hyperlipidemia. 


   - Pt receiving Vitamin D supplementation daily for low Vitamin D level per 

-[facility] ED labs. 


   - Encourage participation in group programming


   - Attempt to schedule phone meetings with wife or other family members. 


2


   - Continue haloperidol and fluoxetine. 


   - Meeting with wife by phone today, and with his mother and brother tomorrow.


   - Encourage patient and his wife to explore options for in home help with 

care of patient's MIL, as this is primary stressor.





2/5


   - Family meeting today with mother and brother, who will transport him home. 

Safety plan completed and reviewed. 72 hour notice will  today and he 

does not want to stay for further treatment, symptoms are improved, appropriate 

for discharge.  Have recommended to family the guns be secured prior to 

discharge for safety.


   - Prescriptions for haloperidol and fluoxetine issued for #30 days with 1 

refill, so that he will have adequate supply until his f/u appointment at 

[facility] (cannot be seen there until he has insurance again). 


   








Risk Factors Assessment


Male:  Yes


:  Yes


/single/:  No


Higher / Fall in social status:  No


Access to guns:  No ( confirmed guns removed from the home and 

secured by brother)


Health problems:  No


Mental Health Diagnoses:  Yes


Substance use disorders:  No


Previous attempt:  No


Previous psychiatric stay:  No


Hopelessness:  Yes


Smoker:  Yes





Protective Factors Assessment


Holiness beliefs:  No


:  Yes


Responsible for young children:  No


Employed:  No


Supportive family:  Yes


Good rapport with provider:  No


Absence of risk factors above:  Yes (risk factors were mitigated by admission 

to the inpatient unit, starting him back on medications and choosing 

medications that are affordable as he does not currently have insurance, 

coordinating care with his outpatient mental health providers in arranging for 

him to have appointments once insurance has been restarted, family meetings 

with his wife, mother, and brother, developing a safety plan, confirming that 

brother removed and secured his guns, involving him in groups and therapy, 

working on healthy coping skills and her discharge safety plan.  The patient 

has been calm and cooperative here, has tolerated medication adjustments well, 

mood has improved, and he has consistently denied suicidality.  He submitted a 

72 hour notice which expires today, and is no longer at acute risk of harm to 

himself, so can be managed as an outpatient at this time.  He does not have 

significant risk factors for harm to others.)





Day of Discharge Assessment


Hospital course:


On admission, the patient was restarted on medications, as he stated he had 

been doing well on risperidone and venlafaxine XR, but mood decompensated when 

he went off of them due to losing his insurance.  As he does not expect to get 

insurance for another few weeks, he was started on less expensive medications 

that would be affordable and on the [employer] $4 list, namely fluoxetine and low-

dose haloperidol.  He tolerated these well, and mood improved.  He submitted a 

72 hour notice requesting to withdraw from treatment, stating that the hospital 

he was transferred from did not inform him that he would need to provide his 

own transportation home, and that he did not really want to be admitted.  He 

denied suicidal thoughts throughout his stay, and was able to process the 

stressors that led to his suicidality on admission, namely stress in caring for 

his ill and declining mother-in-law and an argument with his wife.  He spoke 

with his wife and they reconciled, and she participated in a family meeting by 

phone on 2018.  She stated that his behavior prior to admission was "not 

him.  It was a monster without medications."  The patient agreed that his 

behavior had been negatively affected by going off of his medications and was 

out of character.  He was tearful during the meeting, and his wife stated that 

she was willing to take him back, as initially he had been afraid that she was 

going to leave him.  He talked about how stressful it has been, as his wife is 

working at a nursing home and they are also providing around-the-clock care for 

his mother-in-law who is living with them.  He has not been willing to clean 

her up after she is incontinent, which led to the recent argument.  A family 

meeting was also scheduled with his mother and brother for the day of 

discharge.  His brother was contacted by staff and confirmed that he had 

removed the patient's guns and would keep him at his house until the patient 

had stabilized.  Staff also spoke with his mother, who expressed concern about 

him, stating that he has not been able to keep a job, and that "if someone 

looks at him wrong, he quits."  He wondered if he put on disability for this.  

He attended and participated in groups, anxiety and irritability improved, and 

he was able to work on a discharge safety plan.  He was encouraged to move up 

his follow-up appointment at [facility], but said he did not want 

to do that as he was worried that his insurance would not yet reinstated.





Day of discharge assessment:


The patient states that mood and anxiety have improved, describes his mood is 

"good," and denies any thoughts of harming himself or anyone else.  He is very 

relieved that he and his wife have reconciled, and expresses remorse for his 

behavior prior to admission.  He is looking forward to returning home, seeing 

his wife and his cats, and feels better able to manage his stressors.  He 

denies any side effects to his medications, and feels they're helping him.  He 

is not interested in smoking cessation education, medication, or follow-up, and 

states the first thing he is going to do when he leaves the smoke a cigarette.  

He reports good sleep and appetite, and is able to review his safety plan.  He 

is willing to follow-up with his outpatient providers.





Well nourished, well developed WM appearing stated age. Casually dressed and 

adequately groomed, smells of smoke.  Calm and cooperative.  Seated in NAD, 

with fair eye contact and no abnormal movements.  Speech is normal rate, volume

, and tone.  Mood is "good," and affect is stable and congruent.  Thoughts are 

linear, logical and goal directed.  The patient denied suicidal and homicidal 

ideation and was able to safety plan.  No paranoia, delusions, or hallucinations

, and did not appear to be responding to internal stimuli.  Cognition was 

grossly intact.  Alert and oriented to person, place and time.  Intelligence is 

consistent with level of education.  Insight and and judgment are fair.





Laboratory











Test


  2/3/18


07:05


 


Fasting Glucose 106 


 


Triglycerides Level 285 


 


Cholesterol Level 239 


 


HDL Cholesterol 24 


 


LDL Cholesterol, Calculated 158 


 


VLDL Cholesterol, Calculated 57 


 


Cholesterol/HDL Ratio 10.0 











Total Time


Total Time Spent (min):  Greater than 30 minutes


Total Time Included:  examination of the patient, discharge planning, 

medication reconciliation





Tobacco Cessation at Discharge


Smoking Status:  Current Every Day Smoker


FDA approved Prescription:  declined med & out pt counseling (patient wishes to 

resume smoking on discharge and declined offers for a prescription for the 

patch or gum.) Strong peripheral pulses

## 2021-08-16 NOTE — ED CDU PROVIDER INITIAL DAY NOTE - OBJECTIVE STATEMENT
75yF w/pmhx CKD, HTN, gout, hx spinal fusion p/w left arm weakness x 8am. Pt states she noticed decreased sensation and weakness diffusely to the left arm at 8am. Pts symptoms now resolved. Was code stroke in the ED, CT head w/o acute findings, refused CTa due to renal disease. Pt denies difficulty speaking or swallowing, dizziness, slurred speech, facial droop, cp, sob, palpitations, abd pain, n/v/d, recent travel or illness or any other concerns.  In main ED labs with Cr 2.12, CT head w/o acute findings, neuro evaluated pt is now back at baseline  Sent to CDU for MRI brain/c-spine, echo and US carotid dopplers

## 2021-08-16 NOTE — ED PROVIDER NOTE - CLINICAL SUMMARY MEDICAL DECISION MAKING FREE TEXT BOX
Bridgett Chan MD, PGY-2: 74YO female hx of CKD, HTN, Gout, p/w weakness and decreased sensation to the L arm. PE with decreased sensation and diminished  strength (4+/5) and 4+/5 strength to L UE. suspect ischemic stroke, low suspicion hemorrhage. Pt within window for tpa. given hx of CKD, will defer on CT angio, will get CT head, basic labs, neuro c/s.

## 2021-08-16 NOTE — ED CDU PROVIDER INITIAL DAY NOTE - MUSCULOSKELETAL, MLM
Cont diet/exercise, followed by PCP.   Spine appears normal, range of motion is not limited, no muscle or joint tenderness

## 2021-08-16 NOTE — STROKE CODE NOTE - DISPOSITION
Patient case discussed with stroke fellow, Dr. Doe, under the supervision of stroke attending. Patient case discussed with stroke fellow, Dr. Doe, under the supervision of stroke attending. Patient and daughter refused IV contrast for CTA due to elevated Creatinine.

## 2021-08-16 NOTE — ED PROVIDER NOTE - OBJECTIVE STATEMENT
74YO female hx of CKD, HTN, Gout, p/w weakness and decreased sensation to the L arm. sudden onset this am. dropped coffee cup. no hx of TIA/CVA. denies other focal neuro deficits - no confusion, slurred speech, weakness in other extremities, dizzinness. Denies infectious symptoms of fevers, cough, n/v/diarrhea.

## 2021-08-16 NOTE — ED PROVIDER NOTE - NSICDXPASTMEDICALHX_GEN_ALL_CORE_FT
PAST MEDICAL HISTORY:  Chronic kidney disease, unspecified CKD stage     Gout     HTN (hypertension)

## 2021-08-17 VITALS
OXYGEN SATURATION: 99 % | TEMPERATURE: 98 F | DIASTOLIC BLOOD PRESSURE: 51 MMHG | SYSTOLIC BLOOD PRESSURE: 122 MMHG | RESPIRATION RATE: 15 BRPM | HEART RATE: 72 BPM

## 2021-08-17 PROBLEM — M10.9 GOUT, UNSPECIFIED: Chronic | Status: ACTIVE | Noted: 2017-02-01

## 2021-08-17 PROBLEM — M19.90 UNSPECIFIED OSTEOARTHRITIS, UNSPECIFIED SITE: Chronic | Status: ACTIVE | Noted: 2017-02-01

## 2021-08-17 PROBLEM — E55.9 VITAMIN D DEFICIENCY, UNSPECIFIED: Chronic | Status: ACTIVE | Noted: 2017-02-01

## 2021-08-17 LAB
ALBUMIN SERPL ELPH-MCNC: 4.3 G/DL — SIGNIFICANT CHANGE UP (ref 3.3–5)
ALP SERPL-CCNC: 97 U/L — SIGNIFICANT CHANGE UP (ref 40–120)
ALT FLD-CCNC: 9 U/L — SIGNIFICANT CHANGE UP (ref 4–33)
ANION GAP SERPL CALC-SCNC: 19 MMOL/L — HIGH (ref 7–14)
AST SERPL-CCNC: 14 U/L — SIGNIFICANT CHANGE UP (ref 4–32)
BILIRUB SERPL-MCNC: 0.3 MG/DL — SIGNIFICANT CHANGE UP (ref 0.2–1.2)
BUN SERPL-MCNC: 34 MG/DL — HIGH (ref 7–23)
CALCIUM SERPL-MCNC: 10.4 MG/DL — SIGNIFICANT CHANGE UP (ref 8.4–10.5)
CHLORIDE SERPL-SCNC: 106 MMOL/L — SIGNIFICANT CHANGE UP (ref 98–107)
CO2 SERPL-SCNC: 22 MMOL/L — SIGNIFICANT CHANGE UP (ref 22–31)
CREAT SERPL-MCNC: 1.55 MG/DL — HIGH (ref 0.5–1.3)
GLUCOSE SERPL-MCNC: 101 MG/DL — HIGH (ref 70–99)
POTASSIUM SERPL-MCNC: 4.2 MMOL/L — SIGNIFICANT CHANGE UP (ref 3.5–5.3)
POTASSIUM SERPL-SCNC: 4.2 MMOL/L — SIGNIFICANT CHANGE UP (ref 3.5–5.3)
PROT SERPL-MCNC: 7.3 G/DL — SIGNIFICANT CHANGE UP (ref 6–8.3)
SODIUM SERPL-SCNC: 147 MMOL/L — HIGH (ref 135–145)

## 2021-08-17 PROCEDURE — 93306 TTE W/DOPPLER COMPLETE: CPT | Mod: 26

## 2021-08-17 PROCEDURE — 99217: CPT

## 2021-08-17 RX ADMIN — Medication 81 MILLIGRAM(S): at 10:43

## 2021-08-17 RX ADMIN — AMLODIPINE BESYLATE 10 MILLIGRAM(S): 2.5 TABLET ORAL at 06:46

## 2021-08-17 NOTE — ED CDU PROVIDER SUBSEQUENT DAY NOTE - NS ED ROS FT
· CONSTITUTIONAL: no fever and no chills.  · CARDIOVASCULAR: no chest pain and no edema.  · RESPIRATORY: no chest pain, no cough, and no shortness of breath.  · GASTROINTESTINAL: no abdominal pain, no bloating, no constipation, no diarrhea, no nausea and no vomiting.  · MUSCULOSKELETAL: no back pain, no gout, no musculoskeletal pain, no neck pain, and no weakness.  · SKIN: no abrasions, no jaundice, no lesions, no pruritis, and no rashes.  · NEUROLOGICAL: - - -  · Neurological [+]: left arm weakness  · PSYCHIATRIC: no known mental health issues.  · ROS STATEMENT: all other ROS negative except as per HPI

## 2021-08-17 NOTE — ED CDU PROVIDER DISPOSITION NOTE - PATIENT PORTAL LINK FT
You can access the FollowMyHealth Patient Portal offered by [facility] by registering at the following website: [email or web address]. By joining [employer]’s FollowMyHealth portal, you will also be able to view your health information using other applications (apps) compatible with our system.

## 2021-08-17 NOTE — ED CDU PROVIDER DISPOSITION NOTE - NSFOLLOWUPINSTRUCTIONS_ED_ALL_ED_FT
Follow up with your primary care doctor within 1 week  Follow up with your spine doctor within 1 week  Follow up neurology within 1-2 weeks, office information listed above  Take Aspirin 81mg daily  -continue all other medications as previously prescribed to you  Return to the ER with any worsening or concerning symptoms, weakness, numbness, difficulty speaking or swallowing, difficulty walking or any other concerns.

## 2021-08-17 NOTE — ED CDU PROVIDER SUBSEQUENT DAY NOTE - PHYSICAL EXAMINATION
· CONSTITUTIONAL: Well appearing, awake, alert, oriented to person, place, time/situation and in no apparent distress.  · CARDIAC: Normal rate, regular rhythm.  Heart sounds S1, S2.  · RESPIRATORY: Breath sounds clear and equal bilaterally.  · GASTROINTESTINAL: Abdomen soft, non-tender, no rebound, no guarding.  · MUSCULOSKELETAL: Spine appears normal, range of motion is not limited, no muscle or joint tenderness  · NEUROLOGICAL: Alert and oriented, no focal deficits, no motor or sensory deficits.  · SKIN: Skin normal color for race, warm, dry and intact. No evidence of rash.

## 2021-08-17 NOTE — ED CDU PROVIDER SUBSEQUENT DAY NOTE - ATTENDING CONTRIBUTION TO CARE
Pt sent to CDU for MR for stroke eval. All symptoms currently resolved, MRs w/o e/o stroke. Pt has some cervical disc herniation but no neuro deficits. Echo pending. If WNL, will d/c with neuro outpt f/u.

## 2021-08-17 NOTE — ED CDU PROVIDER DISPOSITION NOTE - CLINICAL COURSE
75yF w/ PMH of CKD, HTN, gout, hx spinal fusion p/w left arm weakness x 8am. Stroke code initiated. During ED course labs with Cr 2.12, CT head w/o acute findings, neuro evaluated pt recommended  MRI brain/c-spine and echocardiogram. At time of assessment pt at baseline, no appreciable or subjective weakness. In CDU MRI brain w/ mild to moderate distal left common carotid and proximal to mid right vertebral artery stenosis, MRI c-spine with C3-C4 disc bulge with cord flattening. Echo within normal. Neuro recommending daily aspirin 81mg and outpt f/u. Pt states she has a spine doctor to f/u with. Discussed all results with pt and daughter, will return with any worsening or concerning symptoms.

## 2021-08-17 NOTE — ED CDU PROVIDER SUBSEQUENT DAY NOTE - PROGRESS NOTE
Dear Dr. Mixon,    Thank you for allowing me to participate in the care of Ely Mccoy.  She is a very pleasant 61-year-old lady.    Chief Complaint   Patient presents with   • Establish Care     Check bilateral #1 for ingrown   • Foot     Left dorsal foot pain       HISTORY OF PRESENT ILLNESS:   The patient presents for sharp pain with some stiffness dorsal left midfoot which started of t insidious onset about 5 months ago and has not responded to various shoes and over-the-counter insoles.  Other patient complaints include recurring ingrown toenails medial hallux bilateral.      REVIEW OF SYSTEMS:   CONSTITUTIONAL:  Alert and oriented x3 with pleasant mood and affect.  Has good attention to grooming, normal nutrition and development.    RESPIRATORY:  Resting respiration rate of 16.   Skin: negative for rash.   Neurologic: Feet were positive for none.  Endocrine: negative for heat or cold intolerance.  Hematologic:Patient on anticoagulants. []  YES    [x]  NO  Extremities:  Edema:  bilaterally.     ALLERGIES:    Allergies as of 02/20/2020   • (No Known Allergies)       I have reviewed the patient's medications and allergies and past medical and surgical histories, updating these as appropriate.  ROS and PFSH reviewed with patient.  See Histories section of the EMR for a display of this information.    PHYSICAL EXAM:   DERMATOLOGICAL:  Significant incurvation medial hallux nail border bilateral with associated pain.  All other nails well groomed bilateral.  Skin is warm dry with decreased suppleness and no open lesions bilateral.  VASCULAR:  Dorsalis pedis and posterior tibial pulses are 2/4 bilateral.  Minimal edema and no varicosities bilateral.  NEUROLOGICAL:  Sensation to touch and proprioception are intact bilateral.  Negative Babinski, negative clonus bilateral.  DTRs, Achilles and patellar are two over five bilateral.    MUSCULOSKELETAL:  Palpable spur with associated pain dorsal left midfoot.   Mild asymptomatic spur dorsal right midfoot.  Lateral deviation of the hallux bilateral.  Dorsal contracture of lesser digits bilateral.  Pronation bilateral.  Muscle strength is +5/5 to all muscle groups bilateral.  No palpable soft tissue masses bilateral.    DATA:    Reviewed previous encounter note as applicable.     RESULT REVIEW:  None today    No orders of the defined types were placed in this encounter.      ASSESSMENT:    1. Other secondary osteoarthritis of both feet    2. Valgus deformity of both great toes    3. Hammer toes of both feet    4. Pronation of both feet    5. Pain in both feet    6. Onychocryptosis    7. Pain of toes of both feet      not improving    PLAN:   Examined patient's feet.  Prescription for custom-molded orthotics as I would be medically necessary for the patient.  Discussion of partial nail matrixectomy for the medial hallux nail border bilateral which the patient will to do this summer and she will e-mail me once she is ready to move forward.  Patient will have Dermatology look at mole on top of her left foot.     Instructions provided as documented in the AVS.    The patient indicated understanding of the diagnosis and agreed with the plan of care    Again, I thank you for allowing me to participate in this patient's care.    Sincerely,    Abelino Garrison DPM   Stable.

## 2021-08-17 NOTE — ED CDU PROVIDER SUBSEQUENT DAY NOTE - PROGRESS NOTE DETAILS
Neuro attg evaluated patient, recommending daily aspirin and spine follow up. Echo within normal, no evidence of PFO. Pt daughter at bedside, pt has a spine doctor to follow up with. Pt provided information for neuro f/u and she will also f/u with her PMD. Discussed strict return precautions

## 2021-08-17 NOTE — ED CDU PROVIDER DISPOSITION NOTE - PROVIDER TOKENS
PROVIDER:[TOKEN:[[id number]]]
GERD (gastroesophageal reflux disease)    History of hyperlipidemia

## 2021-08-17 NOTE — ED CDU PROVIDER DISPOSITION NOTE - CARE PROVIDER_API CALL
Willian Loving)  Neurology; Vascular Neurology  [address], [address]  [place] [address]  Phone: [phone]  Fax: [phone]  Follow Up Time:

## 2021-08-17 NOTE — ED CDU PROVIDER SUBSEQUENT DAY NOTE - MEDICAL DECISION MAKING DETAILS
75yF w/ PMH of CKD, HTN, gout, hx spinal fusion p/w left arm weakness x 8am. Stroke code initiated. During ED course labs with Cr 2.12, CT head w/o acute findings, neuro evaluated pt recommended  MRI brain/c-spine and echocardiogram.

## 2023-11-10 ENCOUNTER — EMERGENCY (EMERGENCY)
Facility: HOSPITAL | Age: 78
LOS: 1 days | Discharge: HOME | End: 2023-11-10
Attending: STUDENT IN AN ORGANIZED HEALTH CARE EDUCATION/TRAINING PROGRAM | Admitting: STUDENT IN AN ORGANIZED HEALTH CARE EDUCATION/TRAINING PROGRAM
Payer: MEDICARE

## 2023-11-10 VITALS
HEART RATE: 67 BPM | RESPIRATION RATE: 18 BRPM | SYSTOLIC BLOOD PRESSURE: 144 MMHG | OXYGEN SATURATION: 100 % | DIASTOLIC BLOOD PRESSURE: 71 MMHG | TEMPERATURE: 98 F | WEIGHT: 169.98 LBS

## 2023-11-10 DIAGNOSIS — Z96.659 PRESENCE OF UNSPECIFIED ARTIFICIAL KNEE JOINT: Chronic | ICD-10-CM

## 2023-11-10 PROBLEM — I10 ESSENTIAL (PRIMARY) HYPERTENSION: Chronic | Status: ACTIVE | Noted: 2021-08-16

## 2023-11-10 PROBLEM — N18.9 CHRONIC KIDNEY DISEASE, UNSPECIFIED: Chronic | Status: ACTIVE | Noted: 2021-08-16

## 2023-11-10 PROBLEM — M10.9 GOUT, UNSPECIFIED: Chronic | Status: ACTIVE | Noted: 2021-08-16

## 2023-11-10 PROCEDURE — 99284 EMERGENCY DEPT VISIT MOD MDM: CPT

## 2023-11-10 PROCEDURE — 73564 X-RAY EXAM KNEE 4 OR MORE: CPT | Mod: 26,RT

## 2023-11-10 RX ORDER — IBUPROFEN 200 MG
600 TABLET ORAL ONCE
Refills: 0 | Status: COMPLETED | OUTPATIENT
Start: 2023-11-10 | End: 2023-11-10

## 2023-11-10 RX ORDER — ACETAMINOPHEN 500 MG
975 TABLET ORAL ONCE
Refills: 0 | Status: COMPLETED | OUTPATIENT
Start: 2023-11-10 | End: 2023-11-10

## 2023-11-10 RX ADMIN — Medication 975 MILLIGRAM(S): at 12:26

## 2023-11-10 NOTE — ED PROVIDER NOTE - PATIENT PORTAL LINK FT
You can access the FollowMyHealth Patient Portal offered by [facility] by registering at the following website: [email or web address]. By joining [employer]’s FollowMyHealth portal, you will also be able to view your health information using other applications (apps) compatible with our system.

## 2023-11-10 NOTE — ED PROVIDER NOTE - CLINICAL SUMMARY MEDICAL DECISION MAKING FREE TEXT BOX
75F, CKD, HTN, Gout, b/l knee replacements who presents with R knee pain. Reports worsening atraumatic R knee pain for the past 1-2 weeks. No falls. No blood thinners. Worse with weight bearing. Ambulatory with a walker at baseline. On ROS, denies headaches, fevers, chills, cough, sputum, cp, sob, abdominal pain, nvd, dysuria, hematuria, recent travel, trauma, syncope, black/bloody stools. Afebrile, well appearing. Physically unremarkable. Legs symmetric. Will obtain screening films. Likely worsening OA.

## 2023-11-10 NOTE — ED ADULT NURSE NOTE - SUICIDE SCREENING QUESTION 2
DISPLAY PLAN FREE TEXT
No

## 2023-11-10 NOTE — ED ADULT TRIAGE NOTE - TEMPERATURE IN CELSIUS (DEGREES C)
Please tell her that the blood work and the EKG and the chest xray all looked good.  Tell her thank you for having it done in anticipation of her upcoming surgery.   36.6

## 2023-11-10 NOTE — ED PROVIDER NOTE - PHYSICAL EXAMINATION
Afebrile, well appearing, neck supple, no rash, rrr, ctabl, abdomen soft and ndnt, no le edema, stable gait.  R Knee: no palp effusion, no skin changes (no erythema, crepitus), no open wounds, able to range R knee.

## 2023-11-10 NOTE — ED PROVIDER NOTE - ATTENDING CONTRIBUTION TO CARE
Dr. Cruz, Attending Physician-  I performed a face to face bedside interview with patient regarding history of present illness, review of symptoms and past medical history. I completed an independent physical exam.  I have discussed patient's plan of care with the fellow.

## 2023-11-10 NOTE — ED PROVIDER NOTE - NSICDXPASTSURGICALHX_GEN_ALL_CORE_FT
PAST SURGICAL HISTORY:  No significant past surgical history     No significant past surgical history     Presence of knee implant

## 2023-11-10 NOTE — ED ADULT NURSE NOTE - OBJECTIVE STATEMENT
A&Ox4. ambulatory. c/o right knee pain. NAD. pt denies SOB, chest pain, dizziness, weakness, urinary symptoms, HA, n/v/d, fevers, chills. respirations are even and un labored. skin intact. right knee has positive swelling. safety precautions maintained.

## 2023-11-10 NOTE — ED PROVIDER NOTE - NSICDXPASTMEDICALHX_GEN_ALL_CORE_FT
PAST MEDICAL HISTORY:  Alopecia     Chronic kidney disease, unspecified CKD stage     Gout     Gout     HTN (hypertension)     HTN (hypertension)     Osteoarthritis     Vitamin D deficiency

## 2023-11-10 NOTE — ED ADULT NURSE NOTE - NSFALLRISKINTERV_ED_ALL_ED
Assistance OOB with selected safe patient handling equipment if applicable/Assistance with ambulation/Communicate fall risk and risk factors to all staff, patient, and family/Monitor gait and stability/Provide visual cue: yellow wristband, yellow gown, etc/Reinforce activity limits and safety measures with patient and family/Call bell, personal items and telephone in reach/Instruct patient to call for assistance before getting out of bed/chair/stretcher/Non-slip footwear applied when patient is off stretcher/[place] to call system/Physically safe environment - no spills, clutter or unnecessary equipment/Purposeful Proactive Rounding/Room/bathroom lighting operational, light cord in reach

## 2023-11-10 NOTE — ED PROVIDER NOTE - NSFOLLOWUPINSTRUCTIONS_ED_ALL_ED_FT
Called patient - MRI without lesions/tumors - states that fluoxetine has helped with chest tightness and dizziness is 20-30% improved. Continuing to get dizzy whenever she moves quickly to turn her head, or gets up/lies down, or when she is driving and cars pass her. Has been having headaches localized on the right side along with periorbital pain - has history of migraines, has taken Tylenol in the past. Does not believe she can go to balance physical therapy due to childcare - will continue fluoxetine, with higher dose on 9/22, and see her back as scheduled. TAKE TYLENOL 650mg EVERY 4 HOURS AS NEEDED FOR MILD PAIN  TAKE MOTRIN 600mg EVERY 6 HOURS AS NEEDED FOR MODERATE PAIN    FOLLOW UP WITH YOUR PRIMARY CARE PROVIDER WITHIN 1 WEEK.    RETURN TO THE EMERGENCY DEPARTMENT FOR ANY WORSENING SYMPTOMS.

## 2023-11-10 NOTE — ED ADULT NURSE NOTE - CHIEF COMPLAINT QUOTE
Pt AOX4 c/o pain in Right knee, unable to flex knee at this time, swelling noted compared to Left knee, no redness or warmth noted; no SOB; knee has been causing her some trouble for months, has been unable to get timely appt to see Ortho for possible drainage of fluid; pt is s/p bilateral knee replacements (12 years ago)  Hx of HTN and stage 3 renal disease; arrives with daughter who is RN Educator at [employer]

## 2023-11-10 NOTE — ED ADULT TRIAGE NOTE - CHIEF COMPLAINT QUOTE
Pt AOX4 c/o pain in Right knee, unable to flex knee at this time, swelling noted compared to Left knee, no redness or warmth noted; no SOB; knee has been causing her some trouble for months, has been unable to get timely appt to see Ortho for possible drainage of fluid; pt is s/p bilateral knee replacements (12 years ago) Pt AOX4 c/o pain in Right knee, unable to flex knee at this time, swelling noted compared to Left knee, no redness or warmth noted; no SOB; knee has been causing her some trouble for months, has been unable to get timely appt to see Ortho for possible drainage of fluid; pt is s/p bilateral knee replacements (12 years ago)  Hx of HTN and stage 3 renal disease; arrives with daughter who is RN Educator at [employer]

## 2023-11-10 NOTE — ED PROVIDER NOTE - OBJECTIVE STATEMENT
75F, CKD, HTN, Gout, b/l knee replacements who presents with R knee pain. Reports worsening atraumatic R knee pain for the past 1-2 weeks. No falls. No blood thinners. Worse with weight bearing. Ambulatory with a walker at baseline. On ROS, denies headaches, fevers, chills, cough, sputum, cp, sob, abdominal pain, nvd, dysuria, hematuria, recent travel, trauma, syncope, black/bloody stools.

## 2023-11-10 NOTE — ED PROVIDER NOTE - NSFOLLOWUPCLINICS_GEN_ALL_ED_FT
[facility] Orthopedic Surgery  Orthopedic Surgery  [address], [address] [place] [address]  Phone: [phone]  Fax: